# Patient Record
Sex: MALE | Race: WHITE | NOT HISPANIC OR LATINO | Employment: FULL TIME | URBAN - METROPOLITAN AREA
[De-identification: names, ages, dates, MRNs, and addresses within clinical notes are randomized per-mention and may not be internally consistent; named-entity substitution may affect disease eponyms.]

---

## 2023-11-29 ENCOUNTER — OFFICE VISIT (OUTPATIENT)
Dept: GASTROENTEROLOGY | Facility: CLINIC | Age: 48
End: 2023-11-29
Payer: COMMERCIAL

## 2023-11-29 VITALS
OXYGEN SATURATION: 95 % | WEIGHT: 234 LBS | DIASTOLIC BLOOD PRESSURE: 87 MMHG | HEART RATE: 81 BPM | HEIGHT: 69 IN | SYSTOLIC BLOOD PRESSURE: 139 MMHG | BODY MASS INDEX: 34.66 KG/M2

## 2023-11-29 DIAGNOSIS — R10.9 ABDOMINAL PAIN, UNSPECIFIED ABDOMINAL LOCATION: Primary | ICD-10-CM

## 2023-11-29 DIAGNOSIS — R14.0 BLOATING: ICD-10-CM

## 2023-11-29 DIAGNOSIS — Z12.11 SCREEN FOR COLON CANCER: ICD-10-CM

## 2023-11-29 DIAGNOSIS — R19.4 CHANGE IN BOWEL HABIT: ICD-10-CM

## 2023-11-29 PROCEDURE — 99204 OFFICE O/P NEW MOD 45 MIN: CPT | Performed by: PHYSICIAN ASSISTANT

## 2023-11-29 NOTE — H&P (VIEW-ONLY)
[facility] - New Patient Office Visit  Nabila Mckeon 51 y.o. male MRN: [id number]  Encounter: [id number]          ASSESSMENT AND PLAN:      1. Abdominal pain, unspecified abdominal location  -We will plan EGD at the time of colonoscopy to investigate and rule out peptic ulcer disease  - get small bowel biopsy to rule out Celiac  - This risks of this procedure include but are not limited to; anesthesia complications, injury/perforation of the bowel, infection and bleeding. 2. Change in bowel habits  - leaning towards diarrhea  - get TSH, Celiac and CRP    3. Screen for colon cancer  - Patient is here for screening colonoscopy. No family history of colon cancer or inflammatory bowel disease. No pacemaker or defibrillator implanted. No chronic kidney disease or solitary kidney. Not on any supplemental oxygen at night. Not on any antiplatelet or anticoagulants. - would get random biopsies to rule out microscopic colitis      ______________________________________________________________________    Chief Complaint: Abdominal pain and need for first screening colonoscopy    HPI: This is a 79-year-old male with no significant past medical history new to our office who presents with chief complaints of abdominal pain and need for first screening colonoscopy. He has had no endoscopies done in the past.    How long have you had the abdominal pain - several years  Where is the pain located - LLQ  How do you describe the pain - sharp   How often do you have the pain - more during physical activity. Not related to bowels.   No fever  How long does the pain last -  5 minutes  Does anything make the pain worse - physical maneuvering  Does anything make the pain better - goes away quickly  Any significant use of NSAIDS - No  Any change in bowels - yes more loose  Any black or bloody stools - No  Any recent imaging - none    How long have you had symptoms - years  Form of stool - loose to watery  Frequency of stool - up to 8 times per day  Recent sick contacts or hospitalization - No  Recent antibiotics - No  Any new medications - No  Recent Travel - No  History of cholecystectomy - No  History of bowel resections - none  Family history of IBS, IBD, Celiac disease - No  Nocturnal diarrhea - No  Is diarrhea mainly post prandial - no  Known food allergies (eg Lactose or Gluten) - Possible lactose intolerance. Blood in stool - No  Previous colonoscopy - none      No pacemaker or defibrillator implanted. No chronic kidney disease or solitary kidney. Not on any supplemental oxygen at night. Not on any antiplatelet or anticoagulants. Endoscopy History:  EGD -none previously  Colonoscopy -none previously    REVIEW OF SYSTEMS:    CONSTITUTIONAL: Denies any fever, chills, rigors, and weight loss. HEENT: Denies hearing loss or visual disturbances. CARDIOVASCULAR: No chest pain or palpitations. RESPIRATORY: Denies any cough, hemoptysis, shortness of breath or dyspnea on exertion. GASTROINTESTINAL: As noted in the History of Present Illness. GENITOURINARY: No problems with urination. Denies any hematuria or dysuria. NEUROLOGIC: No dizziness or vertigo, denies headaches. MUSCULOSKELETAL: Denies any muscle or joint pain. SKIN: Denies skin rashes or itching. ENDOCRINE: Denies excessive thirst. Denies intolerance to heat or cold. PSYCHOSOCIAL: Denies depression or anxiety. Denies any recent memory loss. Historical Information   Past Medical History:   Diagnosis Date   • Hyperlipidemia    • Pre-diabetes      Past Surgical History:   Procedure Laterality Date   • THYROID SURGERY       Social History   Social History     Substance and Sexual Activity   Alcohol Use Yes    Comment: SOCIALLY     Social History     Substance and Sexual Activity   Drug Use Never     Social History     Tobacco Use   Smoking Status Never   Smokeless Tobacco Never     History reviewed.  No pertinent family history. Meds/Allergies     No current outpatient medications on file. No Known Allergies        Objective     Blood pressure 139/87, pulse 81, height 5' 9" (1.753 m), weight 106 kg (234 lb), SpO2 95 %. Body mass index is 34.56 kg/m². PHYSICAL EXAM:      General Appearance:   Alert, cooperative, no distress, appears stated age   HEENT:   Normocephalic, atraumatic, anicteric. Neck:  Supple, symmetrical   Lungs:   Clear to auscultation bilaterally; no rales, rhonchi or wheezing; respirations unlabored    Heart[de-identified]   Regular rate and rhythm; no murmur, rub, or gallop. Abdomen:   Soft, non-tender, non-distended; normal bowel sounds; no masses, no organomegaly    Genitalia:   Deferred    Rectal:   Deferred    Extremities:  No cyanosis, clubbing or edema    Pulses:  Not assessed   Skin:  No jaundice, rashes, or lesions    Lymph nodes:  Not assessed       Lab Results:   No visits with results within 1 Day(s) from this visit. Latest known visit with results is:   No results found for any previous visit. Radiology Results:   No results found. Justin Granger PA-C

## 2023-11-29 NOTE — PATIENT INSTRUCTIONS
Scheduled date of EGD/colonoscopy (as of today): 12/11/23  Physician performing EGD/colonoscopy: Malcom Hairston  Location of EGD/colonoscopy: [facility]  Desired bowel prep reviewed with patient: Miralax  Instructions reviewed with patient by: Wolfgang DENNEY  Clearances:  n/a

## 2023-11-29 NOTE — PROGRESS NOTES
[facility] - New Patient Office Visit  Blaze Orozco 51 y.o. male MRN: [id number]  Encounter: [id number]          ASSESSMENT AND PLAN:      1. Abdominal pain, unspecified abdominal location  -We will plan EGD at the time of colonoscopy to investigate and rule out peptic ulcer disease  - get small bowel biopsy to rule out Celiac  - This risks of this procedure include but are not limited to; anesthesia complications, injury/perforation of the bowel, infection and bleeding. 2. Change in bowel habits  - leaning towards diarrhea  - get TSH, Celiac and CRP    3. Screen for colon cancer  - Patient is here for screening colonoscopy. No family history of colon cancer or inflammatory bowel disease. No pacemaker or defibrillator implanted. No chronic kidney disease or solitary kidney. Not on any supplemental oxygen at night. Not on any antiplatelet or anticoagulants. - would get random biopsies to rule out microscopic colitis      ______________________________________________________________________    Chief Complaint: Abdominal pain and need for first screening colonoscopy    HPI: This is a 39-year-old male with no significant past medical history new to our office who presents with chief complaints of abdominal pain and need for first screening colonoscopy. He has had no endoscopies done in the past.    How long have you had the abdominal pain - several years  Where is the pain located - LLQ  How do you describe the pain - sharp   How often do you have the pain - more during physical activity. Not related to bowels.   No fever  How long does the pain last -  5 minutes  Does anything make the pain worse - physical maneuvering  Does anything make the pain better - goes away quickly  Any significant use of NSAIDS - No  Any change in bowels - yes more loose  Any black or bloody stools - No  Any recent imaging - none    How long have you had symptoms - years  Form of stool - loose to watery  Frequency of stool - up to 8 times per day  Recent sick contacts or hospitalization - No  Recent antibiotics - No  Any new medications - No  Recent Travel - No  History of cholecystectomy - No  History of bowel resections - none  Family history of IBS, IBD, Celiac disease - No  Nocturnal diarrhea - No  Is diarrhea mainly post prandial - no  Known food allergies (eg Lactose or Gluten) - Possible lactose intolerance. Blood in stool - No  Previous colonoscopy - none      No pacemaker or defibrillator implanted. No chronic kidney disease or solitary kidney. Not on any supplemental oxygen at night. Not on any antiplatelet or anticoagulants. Endoscopy History:  EGD -none previously  Colonoscopy -none previously    REVIEW OF SYSTEMS:    CONSTITUTIONAL: Denies any fever, chills, rigors, and weight loss. HEENT: Denies hearing loss or visual disturbances. CARDIOVASCULAR: No chest pain or palpitations. RESPIRATORY: Denies any cough, hemoptysis, shortness of breath or dyspnea on exertion. GASTROINTESTINAL: As noted in the History of Present Illness. GENITOURINARY: No problems with urination. Denies any hematuria or dysuria. NEUROLOGIC: No dizziness or vertigo, denies headaches. MUSCULOSKELETAL: Denies any muscle or joint pain. SKIN: Denies skin rashes or itching. ENDOCRINE: Denies excessive thirst. Denies intolerance to heat or cold. PSYCHOSOCIAL: Denies depression or anxiety. Denies any recent memory loss. Historical Information   Past Medical History:   Diagnosis Date   • Hyperlipidemia    • Pre-diabetes      Past Surgical History:   Procedure Laterality Date   • THYROID SURGERY       Social History   Social History     Substance and Sexual Activity   Alcohol Use Yes    Comment: SOCIALLY     Social History     Substance and Sexual Activity   Drug Use Never     Social History     Tobacco Use   Smoking Status Never   Smokeless Tobacco Never     History reviewed.  No pertinent family history. Meds/Allergies     No current outpatient medications on file. No Known Allergies        Objective     Blood pressure 139/87, pulse 81, height 5' 9" (1.753 m), weight 106 kg (234 lb), SpO2 95 %. Body mass index is 34.56 kg/m². PHYSICAL EXAM:      General Appearance:   Alert, cooperative, no distress, appears stated age   HEENT:   Normocephalic, atraumatic, anicteric. Neck:  Supple, symmetrical   Lungs:   Clear to auscultation bilaterally; no rales, rhonchi or wheezing; respirations unlabored    Heart[de-identified]   Regular rate and rhythm; no murmur, rub, or gallop. Abdomen:   Soft, non-tender, non-distended; normal bowel sounds; no masses, no organomegaly    Genitalia:   Deferred    Rectal:   Deferred    Extremities:  No cyanosis, clubbing or edema    Pulses:  Not assessed   Skin:  No jaundice, rashes, or lesions    Lymph nodes:  Not assessed       Lab Results:   No visits with results within 1 Day(s) from this visit. Latest known visit with results is:   No results found for any previous visit. Radiology Results:   No results found. Ban He PA-C

## 2023-11-29 NOTE — LETTER
November 29, 2023     Chloe Sanchez MD  [address] [address]  [address]    Patient: Theresa Huizar   YOB: 1975   Date of Visit: 11/29/2023       Dear Dr. Maria Esther Brand: Thank you for referring Theresa Huizar to me for evaluation. Below are my notes for this consultation. If you have questions, please do not hesitate to call me. I look forward to following your patient along with you. Sincerely,        Laina Emery PA-C        CC: No Recipients    Na Allan  11/29/2023 10:11 AM  Incomplete  [facility] - New Patient Office Visit  Theresa Huizar 51 y.o. male MRN: [id number]  Encounter: [id number]          ASSESSMENT AND PLAN:      1. Abdominal pain, unspecified abdominal location  -We will plan EGD at the time of colonoscopy to investigate and rule out peptic ulcer disease  - get small bowel biopsy to rule out Celiac  - This risks of this procedure include but are not limited to; anesthesia complications, injury/perforation of the bowel, infection and bleeding. 2. Change in bowel habits  - leaning towards diarrhea  - get TSH, Celiac and CRP    3. Screen for colon cancer  - Patient is here for screening colonoscopy. No family history of colon cancer or inflammatory bowel disease. No pacemaker or defibrillator implanted. No chronic kidney disease or solitary kidney. Not on any supplemental oxygen at night. Not on any antiplatelet or anticoagulants. - would get random biopsies to rule out microscopic colitis      ______________________________________________________________________    Chief Complaint: Abdominal pain and need for first screening colonoscopy    HPI: This is a 50-year-old male with no significant past medical history new to our office who presents with chief complaints of abdominal pain and need for first screening colonoscopy.   He has had no endoscopies done in the past.    How long have you had the abdominal pain - several years  Where is the pain located - LLQ  How do you describe the pain - sharp   How often do you have the pain - more during physical activity. Not related to bowels. No fever  How long does the pain last -  5 minutes  Does anything make the pain worse - physical maneuvering  Does anything make the pain better - goes away quickly  Any significant use of NSAIDS - No  Any change in bowels - yes more loose  Any black or bloody stools - No  Any recent imaging - none    How long have you had symptoms - years  Form of stool - loose to watery  Frequency of stool - up to 8 times per day  Recent sick contacts or hospitalization - No  Recent antibiotics - No  Any new medications - No  Recent Travel - No  History of cholecystectomy - No  History of bowel resections - none  Family history of IBS, IBD, Celiac disease - No  Nocturnal diarrhea - No  Is diarrhea mainly post prandial - no  Known food allergies (eg Lactose or Gluten) - Possible lactose intolerance. Blood in stool - No  Previous colonoscopy - none      No pacemaker or defibrillator implanted. No chronic kidney disease or solitary kidney. Not on any supplemental oxygen at night. Not on any antiplatelet or anticoagulants. Endoscopy History:  EGD -none previously  Colonoscopy -none previously    REVIEW OF SYSTEMS:    CONSTITUTIONAL: Denies any fever, chills, rigors, and weight loss. HEENT: Denies hearing loss or visual disturbances. CARDIOVASCULAR: No chest pain or palpitations. RESPIRATORY: Denies any cough, hemoptysis, shortness of breath or dyspnea on exertion. GASTROINTESTINAL: As noted in the History of Present Illness. GENITOURINARY: No problems with urination. Denies any hematuria or dysuria. NEUROLOGIC: No dizziness or vertigo, denies headaches. MUSCULOSKELETAL: Denies any muscle or joint pain. SKIN: Denies skin rashes or itching. ENDOCRINE: Denies excessive thirst. Denies intolerance to heat or cold.   PSYCHOSOCIAL: Denies depression or anxiety. Denies any recent memory loss. Historical Information  Past Medical History:   Diagnosis Date   • Hyperlipidemia    • Pre-diabetes      Past Surgical History:   Procedure Laterality Date   • THYROID SURGERY       Social History  Social History     Substance and Sexual Activity   Alcohol Use Yes    Comment: SOCIALLY     Social History     Substance and Sexual Activity   Drug Use Never     Social History     Tobacco Use   Smoking Status Never   Smokeless Tobacco Never     History reviewed. No pertinent family history. Meds/Allergies    No current outpatient medications on file. No Known Allergies        Objective    Blood pressure 139/87, pulse 81, height 5' 9" (1.753 m), weight 106 kg (234 lb), SpO2 95 %. Body mass index is 34.56 kg/m². PHYSICAL EXAM:      General Appearance:   Alert, cooperative, no distress, appears stated age   HEENT:   Normocephalic, atraumatic, anicteric. Neck:  Supple, symmetrical   Lungs:   Clear to auscultation bilaterally; no rales, rhonchi or wheezing; respirations unlabored    Heart[de-identified]   Regular rate and rhythm; no murmur, rub, or gallop. Abdomen:   Soft, non-tender, non-distended; normal bowel sounds; no masses, no organomegaly    Genitalia:   Deferred    Rectal:   Deferred    Extremities:  No cyanosis, clubbing or edema    Pulses:  Not assessed   Skin:  No jaundice, rashes, or lesions    Lymph nodes:  Not assessed       Lab Results:   No visits with results within 1 Day(s) from this visit. Latest known visit with results is:   No results found for any previous visit. Radiology Results:   No results found. Daniel Briseno  11/29/2023 10:04 AM  Sign when Signing Visit  [facility] - New Patient Office Visit  Phil Pearce 51 y.o. male MRN: [id number]  Encounter: [id number]          ASSESSMENT AND PLAN:      1.  Abdominal pain, unspecified abdominal location  -We will plan EGD at the time of colonoscopy to investigate and rule out peptic ulcer disease  - get small bowel biopsy to rule out Celiac  - This risks of this procedure include but are not limited to; anesthesia complications, injury/perforation of the bowel, infection and bleeding. 2. Change in bowel habits  - leaning towards diarrhea  - get TSH, Celiac and CRP    3. Screen for colon cancer  - Patient is here for screening colonoscopy. No family history of colon cancer or inflammatory bowel disease. No pacemaker or defibrillator implanted. No chronic kidney disease or solitary kidney. Not on any supplemental oxygen at night. Not on any antiplatelet or anticoagulants. - would get random biopsies to rule out microscopic colitis      ______________________________________________________________________    Chief Complaint: Abdominal pain and need for first screening colonoscopy    HPI: This is a 19-year-old male with no significant past medical history new to our office who presents with chief complaints of abdominal pain and need for first screening colonoscopy.   He has had no endoscopies done in the past.    How long have you had the abdominal pain -   Where is the pain located -   How do you describe the pain -   How often do you have the pain -   How long does the pain last -   Does anything make the pain worse -   Does anything make the pain better -   Any significant use of NSAIDS - No  Any change in bowels - No  Any black or bloody stools - No  Have you been seen by another DrKarena for this pain -   Any recent imaging -   Any previous EGD -     How long have you had symptoms -   Form of stool - loose to watery  Frequency of stool - x times per day  Recent sick contacts or hospitalization - No  Recent antibiotics - No  Any new medications - No  Recent Travel - No  History of cholecystectomy - No  History of bowel resections -   Family history of IBS, IBD, Celiac disease - No  Nocturnal diarrhea - No  Is diarrhea mainly post prandial - Yes  Known food allergies (eg Lactose or Gluten) - No   Blood in stool - No  Previous colonoscopy -       No pacemaker or defibrillator implanted. No chronic kidney disease or solitary kidney. Not on any supplemental oxygen at night. Not on any antiplatelet or anticoagulants. Endoscopy History:  EGD -none previously  Colonoscopy -none previously    REVIEW OF SYSTEMS:    CONSTITUTIONAL: Denies any fever, chills, rigors, and weight loss. HEENT: Denies hearing loss or visual disturbances. CARDIOVASCULAR: No chest pain or palpitations. RESPIRATORY: Denies any cough, hemoptysis, shortness of breath or dyspnea on exertion. GASTROINTESTINAL: As noted in the History of Present Illness. GENITOURINARY: No problems with urination. Denies any hematuria or dysuria. NEUROLOGIC: No dizziness or vertigo, denies headaches. MUSCULOSKELETAL: Denies any muscle or joint pain. SKIN: Denies skin rashes or itching. ENDOCRINE: Denies excessive thirst. Denies intolerance to heat or cold. PSYCHOSOCIAL: Denies depression or anxiety. Denies any recent memory loss. Historical Information  Past Medical History:   Diagnosis Date   • Hyperlipidemia    • Pre-diabetes      Past Surgical History:   Procedure Laterality Date   • THYROID SURGERY       Social History  Social History     Substance and Sexual Activity   Alcohol Use Yes    Comment: SOCIALLY     Social History     Substance and Sexual Activity   Drug Use Never     Social History     Tobacco Use   Smoking Status Never   Smokeless Tobacco Never     History reviewed. No pertinent family history. Meds/Allergies    No current outpatient medications on file. No Known Allergies        Objective    Blood pressure 139/87, pulse 81, height 5' 9" (1.753 m), weight 106 kg (234 lb), SpO2 95 %. Body mass index is 34.56 kg/m².         PHYSICAL EXAM:      General Appearance:   Alert, cooperative, no distress, appears stated age   HEENT:   Normocephalic, atraumatic, anicteric. Neck:  Supple, symmetrical   Lungs:   Clear to auscultation bilaterally; no rales, rhonchi or wheezing; respirations unlabored    Heart[de-identified]   Regular rate and rhythm; no murmur, rub, or gallop. Abdomen:   Soft, non-tender, non-distended; normal bowel sounds; no masses, no organomegaly    Genitalia:   Deferred    Rectal:   Deferred    Extremities:  No cyanosis, clubbing or edema    Pulses:  Not assessed   Skin:  No jaundice, rashes, or lesions    Lymph nodes:  Not assessed       Lab Results:   No visits with results within 1 Day(s) from this visit. Latest known visit with results is:   No results found for any previous visit. Radiology Results:   No results found. Ricki Rod PA-C

## 2023-12-04 ENCOUNTER — TELEPHONE (OUTPATIENT)
Dept: GASTROENTEROLOGY | Facility: AMBULARY SURGERY CENTER | Age: 48
End: 2023-12-04

## 2023-12-04 NOTE — TELEPHONE ENCOUNTER
Patient is aware of procedure for 12/11/23 at [facility] location in [facility] and understand that they will call him with time on Friday December 8th, 2023     Spoke to patient directly to patient and let him aware I emailed prep instructions to his email and clarified instructions to him over the phone.

## 2023-12-06 LAB
CRP SERPL-MCNC: 1 MG/L (ref 0–10)
ENDOMYSIUM IGA SER QL: NEGATIVE
IGA SERPL-MCNC: 258 MG/DL (ref 90–386)
TSH SERPL DL<=0.005 MIU/L-ACNC: 1.02 UIU/ML (ref 0.45–4.5)
TTG IGA SER-ACNC: <2 U/ML (ref 0–3)

## 2023-12-07 LAB
C DIFF TOX GENS STL QL NAA+PROBE: NEGATIVE
G LAMBLIA AG STL QL IA: NEGATIVE
Lab: NORMAL
O+P STL CONC: NORMAL

## 2023-12-08 ENCOUNTER — ANESTHESIA EVENT (OUTPATIENT)
Dept: ANESTHESIOLOGY | Facility: HOSPITAL | Age: 48
End: 2023-12-08

## 2023-12-08 ENCOUNTER — ANESTHESIA (OUTPATIENT)
Dept: ANESTHESIOLOGY | Facility: HOSPITAL | Age: 48
End: 2023-12-08

## 2023-12-11 ENCOUNTER — ANESTHESIA EVENT (OUTPATIENT)
Dept: GASTROENTEROLOGY | Facility: AMBULARY SURGERY CENTER | Age: 48
End: 2023-12-11

## 2023-12-11 ENCOUNTER — ANESTHESIA (OUTPATIENT)
Dept: GASTROENTEROLOGY | Facility: AMBULARY SURGERY CENTER | Age: 48
End: 2023-12-11

## 2023-12-11 ENCOUNTER — HOSPITAL ENCOUNTER (OUTPATIENT)
Dept: GASTROENTEROLOGY | Facility: AMBULARY SURGERY CENTER | Age: 48
Setting detail: OUTPATIENT SURGERY
Discharge: HOME | End: 2023-12-11
Attending: INTERNAL MEDICINE
Payer: COMMERCIAL

## 2023-12-11 VITALS
RESPIRATION RATE: 16 BRPM | TEMPERATURE: 97.6 F | OXYGEN SATURATION: 97 % | DIASTOLIC BLOOD PRESSURE: 72 MMHG | SYSTOLIC BLOOD PRESSURE: 118 MMHG | HEART RATE: 60 BPM

## 2023-12-11 DIAGNOSIS — R10.9 ABDOMINAL PAIN, UNSPECIFIED ABDOMINAL LOCATION: ICD-10-CM

## 2023-12-11 DIAGNOSIS — R14.0 BLOATING: ICD-10-CM

## 2023-12-11 DIAGNOSIS — R19.4 CHANGE IN BOWEL HABIT: ICD-10-CM

## 2023-12-11 PROCEDURE — 45380 COLONOSCOPY AND BIOPSY: CPT | Performed by: INTERNAL MEDICINE

## 2023-12-11 PROCEDURE — 45385 COLONOSCOPY W/LESION REMOVAL: CPT | Performed by: INTERNAL MEDICINE

## 2023-12-11 PROCEDURE — 43239 EGD BIOPSY SINGLE/MULTIPLE: CPT | Performed by: INTERNAL MEDICINE

## 2023-12-11 PROCEDURE — 88305 TISSUE EXAM BY PATHOLOGIST: CPT | Performed by: PATHOLOGY

## 2023-12-11 RX ORDER — SODIUM CHLORIDE, SODIUM LACTATE, POTASSIUM CHLORIDE, CALCIUM CHLORIDE 600; 310; 30; 20 MG/100ML; MG/100ML; MG/100ML; MG/100ML
INJECTION, SOLUTION INTRAVENOUS CONTINUOUS PRN
Status: DISCONTINUED | OUTPATIENT
Start: 2023-12-11 | End: 2023-12-11

## 2023-12-11 RX ORDER — LIDOCAINE HYDROCHLORIDE 10 MG/ML
INJECTION, SOLUTION EPIDURAL; INFILTRATION; INTRACAUDAL; PERINEURAL AS NEEDED
Status: DISCONTINUED | OUTPATIENT
Start: 2023-12-11 | End: 2023-12-11

## 2023-12-11 RX ORDER — SODIUM CHLORIDE, SODIUM LACTATE, POTASSIUM CHLORIDE, CALCIUM CHLORIDE 600; 310; 30; 20 MG/100ML; MG/100ML; MG/100ML; MG/100ML
125 INJECTION, SOLUTION INTRAVENOUS CONTINUOUS
Status: DISCONTINUED | OUTPATIENT
Start: 2023-12-11 | End: 2023-12-15 | Stop reason: HOSPADM

## 2023-12-11 RX ORDER — ONDANSETRON 2 MG/ML
4 INJECTION INTRAMUSCULAR; INTRAVENOUS ONCE AS NEEDED
Status: CANCELLED | OUTPATIENT
Start: 2023-12-11

## 2023-12-11 RX ORDER — PROPOFOL 10 MG/ML
INJECTION, EMULSION INTRAVENOUS AS NEEDED
Status: DISCONTINUED | OUTPATIENT
Start: 2023-12-11 | End: 2023-12-11

## 2023-12-11 RX ORDER — PANTOPRAZOLE SODIUM 40 MG/1
40 TABLET, DELAYED RELEASE ORAL DAILY
Qty: 30 TABLET | Refills: 11 | Status: SHIPPED | OUTPATIENT
Start: 2023-12-11

## 2023-12-11 RX ADMIN — PROPOFOL 120 MCG/KG/MIN: 10 INJECTION, EMULSION INTRAVENOUS at 10:40

## 2023-12-11 RX ADMIN — PROPOFOL 170 MG: 10 INJECTION, EMULSION INTRAVENOUS at 10:39

## 2023-12-11 RX ADMIN — LIDOCAINE HYDROCHLORIDE 50 MG: 10 INJECTION, SOLUTION EPIDURAL; INFILTRATION; INTRACAUDAL; PERINEURAL at 10:39

## 2023-12-11 RX ADMIN — SODIUM CHLORIDE, SODIUM LACTATE, POTASSIUM CHLORIDE, AND CALCIUM CHLORIDE: .6; .31; .03; .02 INJECTION, SOLUTION INTRAVENOUS at 10:29

## 2023-12-11 NOTE — ANESTHESIA POSTPROCEDURE EVALUATION
Post-Op Assessment Note    CV Status:  Stable  Pain Score: 0    Pain management: adequate       Mental Status:  Sleepy and arousable   Hydration Status:  Euvolemic and stable   PONV Controlled:  Controlled   Airway Patency:  Patent     Post Op Vitals Reviewed: Yes      Staff: CRNA               BP   10/56   Temp      Pulse  65   Resp   15   SpO2   100% Mohs Method Verbiage: An incision at a 90 degree angle following the standard Mohs approach was done and the specimen was harvested as a microscopic controlled layer.

## 2023-12-11 NOTE — ANESTHESIA PREPROCEDURE EVALUATION
Procedure:  COLONOSCOPY  EGD    Relevant Problems   ANESTHESIA (within normal limits)      CARDIO (within normal limits)      ENDO (within normal limits)      PULMONARY (within normal limits)        Physical Exam    Airway    Mallampati score: II  TM Distance: >3 FB  Neck ROM: full     Dental   No notable dental hx     Cardiovascular  Cardiovascular exam normal    Pulmonary  Pulmonary exam normal     Other Findings      Anesthesia Plan  ASA Score- 2     Anesthesia Type- IV sedation with anesthesia with ASA Monitors. Additional Monitors:     Airway Plan:            Plan Factors-Exercise tolerance (METS): >4 METS. Chart reviewed. Existing labs reviewed. Patient summary reviewed. Patient is not a current smoker. Induction-     Postoperative Plan-     Informed Consent- Anesthetic plan and risks discussed with patient. I personally reviewed this patient with the CRNA. Discussed and agreed on the Anesthesia Plan with the CRNA. Izabel Valentino

## 2023-12-14 PROCEDURE — 88305 TISSUE EXAM BY PATHOLOGIST: CPT | Performed by: PATHOLOGY

## 2023-12-21 ENCOUNTER — TELEPHONE (OUTPATIENT)
Age: 48
End: 2023-12-21

## 2023-12-21 NOTE — TELEPHONE ENCOUNTER
Jill from [employer] Dr. García Russell asking for pathology report on pt's last colonscopy/egd report. I gave Jill medical records phone # and transferred her to them.